# Patient Record
Sex: MALE | Race: WHITE
[De-identification: names, ages, dates, MRNs, and addresses within clinical notes are randomized per-mention and may not be internally consistent; named-entity substitution may affect disease eponyms.]

---

## 2019-01-11 NOTE — HP
HISTORY AND PHYSICAL:

 

DATE OF ADMISSION/SURGERY:  01/16/19

 

DATE OF OFFICE VISIT:  01/11/19

 

SURGEON:  Arlene Almeida MD * (DICTATED BY CARLOS MENDEZ)

 

PROCEDURE:  Right knee arthroscopy with partial medial meniscectomy, possible 
chondroplasty, possible synovectomy.

 

CHIEF COMPLAINT:  Right knee pain.

 

HISTORY OF PRESENT ILLNESS:  Mr. Tsang is a 53-year-old gentleman with 
complaints of right knee pain and MRI confirms a medial meniscus tear.  He has 
elected to proceed with surgery.

 

PAST MEDICAL HISTORY:  Denies.

 

PAST SURGICAL HISTORY:  [place] teeth extraction.

 

CURRENT MEDICATIONS:  Denies.

 

ALLERGIES:  No known drug allergies.

 

FAMILY HISTORY:  Diabetes.

 

SOCIAL HISTORY:  He is a 53-year-old gentleman.  He lives alone.  He does not 
smoke or use drugs.  Uses occasional alcohol.

 

REVIEW OF SYSTEMS:  A complete 14-point review of systems was reviewed with the 
patient, all negative or noncontributory.  He denies history of DVT, PE, 
hepatitis, HIV, or anesthesia problems.

 

                               PHYSICAL EXAMINATION

 

GENERAL:  He is well developed, well nourished, in no acute distress.

 

VITAL SIGNS:  He stands 70 inches tall, weighs 209 pounds.  His blood pressure 
is 126/76 and his heart rate is 56.

 

HEENT:  Normocephalic, atraumatic.

 

NECK:  Supple.  No palpable lymph nodes.

 

PULMONARY:  The lungs are clear to auscultation bilaterally.

 

CARDIO:  Regular rate and rhythm.  Strong S1 and S2.

 

ABDOMEN:  Soft, nontender, nondistended.

 

NEUROLOGICAL:  He is alert and oriented x3.

 

MUSCULOSKELETAL:  Right lower extremity, the skin is intact.  There are no open 
wounds or abrasions.  He has some tenderness over the medial joint line.  
Positive Ton's.  Positive Apley's.  Negative Lachman's.  Range of motion 
is 0 to 130 degrees of flexion with a 2+ dorsalis pedis pulse, intact 
sensation.  His lower extremity muscle group strengths are intact at 5/5.

 

 ASSESSMENT AND PLAN:  Mr. Tsang is a 53-year-old gentleman with continued 
complaints of right knee pain and MRI confirms the medial meniscus tear.  He 
has elected to proceed with the right knee arthroscopy with partial medial 
meniscectomy, possible chondroplasty, possible synovectomy.  The surgery is 
scheduled for 01/16/19 with Dr. Almeida.  Dr. Almeida discussed the risks and 
benefits of the surgery at today's visit and all of his questions were 
answered.  He will follow up with Dr. Almeida 2 weeks after the surgery.

 

 ____________________________________ CARLOS MENDEZ

 

[id number] #: [id number]

ARMANDO

## 2019-01-16 ENCOUNTER — HOSPITAL ENCOUNTER (OUTPATIENT)
Dept: HOSPITAL 25 - OR | Age: 54
Discharge: HOME | End: 2019-01-16
Attending: ORTHOPAEDIC SURGERY
Payer: COMMERCIAL

## 2019-01-16 VITALS — SYSTOLIC BLOOD PRESSURE: 132 MMHG | DIASTOLIC BLOOD PRESSURE: 65 MMHG

## 2019-01-16 DIAGNOSIS — M17.11: ICD-10-CM

## 2019-01-16 DIAGNOSIS — S83.241A: Primary | ICD-10-CM
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
## 2019-01-16 DIAGNOSIS — Z87.891: ICD-10-CM

## 2019-01-16 NOTE — OP
DATE OF OPERATION:  01/16/19 - [facility]

 

DATE OF BIRTH:  [birth year]

 

ATTENDING SURGEON:  Arlene Almeida MD.

 

ASSISTANT:  CARLOS Rodas.  Ms. Mccord did help throughout the procedure 
with preparation of the leg, wound retraction, manipulation of the knee, and 
wound closure.

 

ANESTHESIOLOGIST:  Dr. Pedroza.

 

ANESTHESIA:  General.

 

PRE-OP DIAGNOSIS:  Right knee medial meniscal tear.

 

POST-OP DIAGNOSIS:  Right knee medial meniscal tear, moderate osteoarthritis of 
the medial compartment.

 

OPERATIVE PROCEDURE:  Right knee arthroscopy with partial medial meniscectomy 
and medial chondroplasty.

 

ESTIMATED BLOOD LOSS:  Less than 25 cc.

 

COMPLICATIONS:  None.

 

SPECIMENS:  None.

 

BRIEF HISTORY/INDICATIONS:  Mr. Tsang is a 53-year-old gentleman with 
mechanical symptoms and pain in the right knee over many months.  He failed 
conservative treatment.  MRI confirmed a medial meniscal tear.  Due to 
continued pain and decreased quality of life, he elected to undergo a right 
knee arthroscopy with partial meniscectomy.  Informed consent was obtained from 
the patient.  He understood the risks of surgery included, but were not limited 
to, bleeding, infection, damage to nearby structures, continued pain, need for 
further surgery, retear of the meniscus, progression of arthritis, stroke, 
heart attack, blood clot, and death.  He wished to proceed.

 

INTRAOPERATIVE FINDINGS:  Intraoperatively, the patient was noted to have a 
significant area along the medial femoral condyle posteriorly with exposed 
subchondral bone and cartilage flapping.  This was grade 3 and 4 [place] 
cartilage changes.  He was found to have an anteriorly displaced parrot beak 
type tear of the medial meniscus along the body at the red-white junction.

 

DESCRIPTION OF PROCEDURE:  Mr. Tsang was identified in the preanesthesia 
unit. His right lower extremity was marked as the correct operative side.  
Informed consent was signed and placed in the chart.  The patient was taken to 
the operating room and placed under general anesthesia.  Right lower extremity 
was prepped and draped in the usual sterile fashion.  Preop time-out was made 
to correctly identify the patient, side, and site.  Appropriate perioperative 
antibiotics were given within 1 hour of incision.

 

A standard 0.5-cm anterolateral portal incision was made with a 15-blade and 
carried down to the capsule.  Trocar was introduced.  As soon as the light and 
water sources were turned on, there was immediate visualization of the 
suprapatellar pouch.  A tour of the knee joint was performed.  Suprapatellar 
pouch showed no obvious abnormality.  Patellofemoral compartment had minimal 
degenerative changes.  The medial gutter showed no plica or loose body.  The 
medial joint compartment showed cartilage flapping and exposed subchondral bone 
along the medial portion or inner portion of the medial femoral condyle.  There 
was also a visible medial meniscal tear with displacement of a parrot beak type 
fragment into the joint space.  ACL and PCL appeared to be intact.  The knee 
was placed in a figure-of-four position.  There was no obvious lateral meniscal 
tear.  There were no significant degenerative changes.

 

Under direct visualization, a medial portal incision was made.  Probe was 
introduced and a second tour of the knee joint was performed.  No additional 
findings were noted.  Straight biter and shaver were used to perform partial 
medial meniscectomy.  Further probing of the meniscus showed no additional 
meniscal tear or flaps.  A smooth border of the medial meniscus was obtained 
along the white-red zone.  Radiofrequency ablation wand was used to further 
smooth the edge of the meniscus.  Next, the radiofrequency ablation wand was 
used to smooth the edge of the cartilage flapping along the medial femoral 
condyle in a conservative fashion.

 

The knee was copiously irrigated with sterile saline.  All instruments were 
removed.  Incisions were closed using interrupted 3-0 nylon suture.  An 
intraarticular injection of 80 mg of Depo-Medrol and 6 cc of 0.25% Marcaine was 
placed in the knee joint.  Sterile Xeroform, 4x4s, and Webril were used to 
cover the incision.  Ace wrap and cold pack were placed over this.  The patient'
s anesthesia was reversed without difficulty.  He was taken to the PACU in 
stable condition.  Intended weightbearing will be weightbearing as tolerated.  
Intended DVT prophylaxis will be aspirin.

 

 [id number]/CPS #: [id number]

[facility] CenterTATIANA